# Patient Record
Sex: MALE
[De-identification: names, ages, dates, MRNs, and addresses within clinical notes are randomized per-mention and may not be internally consistent; named-entity substitution may affect disease eponyms.]

---

## 2022-06-27 ENCOUNTER — HOSPITAL ENCOUNTER (EMERGENCY)
Dept: HOSPITAL 5 - ED | Age: 22
LOS: 1 days | Discharge: HOME | End: 2022-06-28
Payer: SELF-PAY

## 2022-06-27 VITALS — DIASTOLIC BLOOD PRESSURE: 75 MMHG | SYSTOLIC BLOOD PRESSURE: 119 MMHG

## 2022-06-27 DIAGNOSIS — R05.9: ICD-10-CM

## 2022-06-27 DIAGNOSIS — Z20.822: ICD-10-CM

## 2022-06-27 DIAGNOSIS — R50.9: Primary | ICD-10-CM

## 2022-06-27 DIAGNOSIS — J34.89: ICD-10-CM

## 2022-06-27 LAB
ALBUMIN SERPL-MCNC: 4.6 G/DL (ref 3.9–5)
ALT SERPL-CCNC: 32 UNITS/L (ref 7–56)
APTT BLD: 32.9 SEC. (ref 24.2–36.6)
BASOPHILS # (AUTO): 0.1 K/MM3 (ref 0–0.1)
BASOPHILS NFR BLD AUTO: 0.5 % (ref 0–1.8)
BUN SERPL-MCNC: 8 MG/DL (ref 9–20)
BUN/CREAT SERPL: 9 %
CALCIUM SERPL-MCNC: 9.4 MG/DL (ref 8.4–10.2)
EOSINOPHIL # BLD AUTO: 0 K/MM3 (ref 0–0.4)
EOSINOPHIL NFR BLD AUTO: 0.1 % (ref 0–4.3)
HCT VFR BLD CALC: 44.6 % (ref 35.5–45.6)
HEMOLYSIS INDEX: 72
HGB BLD-MCNC: 15.2 GM/DL (ref 11.8–15.2)
INR PPP: 1.04 (ref 0.87–1.13)
LYMPHOCYTES # BLD AUTO: 1 K/MM3 (ref 1.2–5.4)
LYMPHOCYTES NFR BLD AUTO: 8.3 % (ref 13.4–35)
MCHC RBC AUTO-ENTMCNC: 34 % (ref 32–34)
MCV RBC AUTO: 88 FL (ref 84–94)
MONOCYTES # (AUTO): 0.9 K/MM3 (ref 0–0.8)
MONOCYTES % (AUTO): 7.4 % (ref 0–7.3)
PLATELET # BLD: 258 K/MM3 (ref 140–440)
RBC # BLD AUTO: 5.06 M/MM3 (ref 3.65–5.03)

## 2022-06-27 PROCEDURE — 84484 ASSAY OF TROPONIN QUANT: CPT

## 2022-06-27 PROCEDURE — 85610 PROTHROMBIN TIME: CPT

## 2022-06-27 PROCEDURE — 99284 EMERGENCY DEPT VISIT MOD MDM: CPT

## 2022-06-27 PROCEDURE — 71275 CT ANGIOGRAPHY CHEST: CPT

## 2022-06-27 PROCEDURE — 80053 COMPREHEN METABOLIC PANEL: CPT

## 2022-06-27 PROCEDURE — 85730 THROMBOPLASTIN TIME PARTIAL: CPT

## 2022-06-27 PROCEDURE — 96375 TX/PRO/DX INJ NEW DRUG ADDON: CPT

## 2022-06-27 PROCEDURE — 85025 COMPLETE CBC W/AUTO DIFF WBC: CPT

## 2022-06-27 PROCEDURE — 96374 THER/PROPH/DIAG INJ IV PUSH: CPT

## 2022-06-27 PROCEDURE — 36415 COLL VENOUS BLD VENIPUNCTURE: CPT

## 2022-06-27 PROCEDURE — 93005 ELECTROCARDIOGRAM TRACING: CPT

## 2022-06-27 PROCEDURE — 96361 HYDRATE IV INFUSION ADD-ON: CPT

## 2022-06-27 PROCEDURE — 83690 ASSAY OF LIPASE: CPT

## 2022-06-27 PROCEDURE — 71046 X-RAY EXAM CHEST 2 VIEWS: CPT

## 2022-06-27 PROCEDURE — 83735 ASSAY OF MAGNESIUM: CPT

## 2022-06-27 NOTE — CAT SCAN REPORT
CTA CHEST WITH CONTRAST



INDICATION / CLINICAL INFORMATION: cp/sob/hypoexia.



TECHNIQUE: Axial CT images were obtained through the chest after injection of Omnipaque 350, 100 cc I
V contrast. 3 plane MIP and/or 3D reconstructions were produced. All CT scans at this location are pe
rformed using CT dose reduction for ALARA by means of automated exposure control. 



COMPARISON: None available.



FINDINGS:

PULMONARY ARTERIES: No pulmonary emboli.

THORACIC AORTA: No significant abnormality. 

HEART: No significant abnormality.

CORONARY ARTERY CALCIFICATION: None.

MEDIASTINUM / MARISELA: No significant abnormality.

PLEURA: No pleural effusion. No pneumothorax.

LUNGS: No acute air space or interstitial disease. 



ADDITIONAL FINDINGS: None.



UPPER ABDOMEN: No acute findings.



SKELETAL STRUCTURES: No significant osseous abnormality.



IMPRESSION:

1. No CT evidence for pulmonary embolism. 

2. Negative for pneumonia.



Signer Name: Lenard Worthington MD 

Signed: 6/27/2022 7:14 PM

Workstation Name: [id number]

## 2022-06-27 NOTE — XRAY REPORT
CHEST PA AND LATERAL VIEWS



INDICATION: 

chest tightness.



COMPARISON: 

None.



FINDINGS:



Support devices: None.

Heart: Within normal limits. 

Lungs/Pleura: No acute pulmonary or pleural findings.  







IMPRESSION:

1. No acute findings.



Signer Name: Venu Burciaga MD 

Signed: 6/27/2022 1:34 PM

Workstation Name: [employer]

## 2022-06-27 NOTE — EMERGENCY DEPARTMENT REPORT
- General


Chief Complaint: Dyspnea/Respdistress


Stated Complaint: VOMITING/CHEST TIGHTNESS


Time Seen by Provider: 22 16:40


Source: patient


Mode of arrival: Ambulatory


Limitations: No Limitations





- History of Present Illness


Initial Comments: 





This is a 21-year-old male nontoxic, well nourished in appearance, no acute 

signs of distress presents to the ED with c/o of productive cough, fever, 

chills, body aches, shortness of breathe, chest tightness, nausea, vomiting 

rhinorrhea, nasal congestion x several days.  Stated is COVID vaccinated.  

Patient describes productive cough as yellow mucus production.  Patient denies 

any sick contacts.  Patient denies any recent travels, long car, recent hospital

stays.  Patient denies any calf pain or calf tenderness.  Denies any other 

complaints or symptoms. Patient denies pleuritic chest pain. Patient denies any 

hemoptysis, numbness, tingling, abdominal pain, headache or stiff neck.  








MD Complaint: fever, cough, rhinorrhea, nasal congestion, other (CP/SOB/n/v)


-: days(s)


Severity: mild


Severity scale (0 -10): 8


Quality: aching


Consistency: constant


Improves With: nothing


Worsens With: nothing


Associated Symptoms: fever, chills, rhinorrhea, nasal congestion, cough, chest 

pain, shortness of breath, nausea, vomiting.  denies: myalgias, diaphoresis, 

headache, sore throat, stiff neck, abdominal pain, diarrhea, dysuria, rash, 

confusion, right sweats, weight loss, epistaxis, hoarseness, ear pain


Treatments Prior to Arrival: none





- Related Data


                                  Previous Rx's











 Medication  Instructions  Recorded  Last Taken  Type


 


Acetaminophen [Acetaminophen 8 650 mg PO Q8H PRN #20 tab 22 Unknown Rx





Hour]    


 


Benzonatate [Tessalon Perles] 100 mg PO Q8HR PRN #12 cap 22 Unknown Rx











                                    Allergies











Allergy/AdvReac Type Severity Reaction Status Date / Time


 


No Known Allergies Allergy   Verified 22 12:58














ED Review of Systems


ROS: 


Stated complaint: VOMITING/CHEST TIGHTNESS


Other details as noted in HPI





Comment: All other systems reviewed and negative


Constitutional: chills, fever


Eyes: denies: eye pain, eye discharge, vision change


ENT: congestion.  denies: ear pain, throat pain, dental pain, hearing loss, 

epistaxis


Respiratory: cough, shortness of breath.  denies: wheezing


Cardiovascular: chest pain.  denies: palpitations, dyspnea on exertion, 

orthopnea, edema, syncope, paroxysmal nocturnal dyspnea


Endocrine: no symptoms reported


Gastrointestinal: nausea, vomiting.  denies: abdominal pain, diarrhea, 

constipation, hematemesis, melena, hematochezia


Genitourinary: denies: urgency, dysuria


Musculoskeletal: denies: back pain, joint swelling, arthralgia


Skin: denies: rash, lesions


Neurological: denies: headache, weakness, paresthesias


Psychiatric: denies: anxiety, depression


Hematological/Lymphatic: denies: easy bleeding, easy bruising





ED Past Medical Hx





- Past Medical History


Previous Medical History?: No





- Surgical History


Past Surgical History?: No





- Social History


Smoking Status: Never Smoker





- Medications


Home Medications: 


                                Home Medications











 Medication  Instructions  Recorded  Confirmed  Last Taken  Type


 


Acetaminophen [Acetaminophen 8 650 mg PO Q8H PRN #20 tab 22  Unknown Rx





Hour]     


 


Benzonatate [Tessalon Perles] 100 mg PO Q8HR PRN #12 cap 22  Unknown Rx














ED Physical Exam





- General


Limitations: No Limitations


General appearance: alert, in no apparent distress





- Head


Head exam: Present: atraumatic, normocephalic





- Eye


Eye exam: Present: normal appearance, PERRL, EOMI





- ENT


ENT exam: Present: normal exam, normal orophraynx, TM's normal bilaterally, 

normal external ear exam





- Neck


Neck exam: Present: normal inspection, full ROM.  Absent: tenderness, 

meningismus, lymphadenopathy





- Respiratory


Respiratory exam: Present: normal lung sounds bilaterally.  Absent: respiratory 

distress, wheezes, rales, rhonchi, stridor, chest wall tenderness, accessory 

muscle use, decreased breath sounds, prolonged expiratory





- Cardiovascular


Cardiovascular Exam: Present: normal rhythm, tachycardia, normal heart sounds.  

Absent: irregular rhythm, systolic murmur, diastolic murmur, rubs, gallop





- GI/Abdominal


GI/Abdominal exam: Present: soft, normal bowel sounds.  Absent: distended, 

tenderness, guarding, rebound, rigid, diminished bowel sounds





- Extremities Exam


Extremities exam: Present: normal inspection, full ROM, normal capillary refill.

 Absent: tenderness





- Back Exam


Back exam: Present: normal inspection, full ROM.  Absent: tenderness, CVA 

tenderness (R), CVA tenderness (L), muscle spasm, paraspinal tenderness, 

vertebral tenderness, rash noted





- Neurological Exam


Neurological exam: Present: alert, oriented X3, normal gait





- Psychiatric


Psychiatric exam: Present: normal affect, normal mood





- Skin


Skin exam: Present: warm, dry, intact, normal color.  Absent: rash





ED Course


                                   Vital Signs











  22





  12:55 17:46 18:22


 


Temperature 101.3 F H  98.6 F


 


Pulse Rate 116 H 96 H 


 


Respiratory 26 H 18 





Rate   


 


Blood Pressure 122/70  


 


Blood Pressure  126/79 





[Left]   


 


O2 Sat by Pulse 98 96 





Oximetry   














  22





  20:26


 


Temperature 97.7 F


 


Pulse Rate 66


 


Respiratory 





Rate 


 


Blood Pressure 


 


Blood Pressure 119/75





[Left] 


 


O2 Sat by Pulse 





Oximetry 














- Reevaluation(s)


Reevaluation #1: 





22 19:50


Patient is speaking in full sentences with no signs of distress noted.





ED Medical Decision Making





- Lab Data


Result diagrams: 


                                 22 13:27





                                 22 13:27








                                   Lab Results











  22 Range/Units





  13:27 13:27 16:52 


 


WBC  11.9 H    (4.5-11.0)  K/mm3


 


RBC  5.06 H    (3.65-5.03)  M/mm3


 


Hgb  15.2    (11.8-15.2)  gm/dl


 


Hct  44.6    (35.5-45.6)  %


 


MCV  88    (84-94)  fl


 


MCH  30    (28-32)  pg


 


MCHC  34    (32-34)  %


 


RDW  12.7 L    (13.2-15.2)  %


 


Plt Count  258    (140-440)  K/mm3


 


Lymph % (Auto)  8.3 L    (13.4-35.0)  %


 


Mono % (Auto)  7.4 H    (0.0-7.3)  %


 


Eos % (Auto)  0.1    (0.0-4.3)  %


 


Baso % (Auto)  0.5    (0.0-1.8)  %


 


Lymph # (Auto)  1.0 L    (1.2-5.4)  K/mm3


 


Mono # (Auto)  0.9 H    (0.0-0.8)  K/mm3


 


Eos # (Auto)  0.0    (0.0-0.4)  K/mm3


 


Baso # (Auto)  0.1    (0.0-0.1)  K/mm3


 


Seg Neutrophils %  83.7 H    (40.0-70.0)  %


 


Seg Neutrophils #  9.9 H    (1.8-7.7)  K/mm3


 


PT    15.1 H  (12.2-14.9)  Sec.


 


INR    1.04  (0.87-1.13)  


 


APTT    32.9  (24.2-36.6)  Sec.


 


Sodium   135 L   (137-145)  mmol/L


 


Potassium   4.1   (3.6-5.0)  mmol/L


 


Chloride   99.7   ()  mmol/L


 


Carbon Dioxide   20 L   (22-30)  mmol/L


 


Anion Gap   19   mmol/L


 


BUN   8 L   (9-20)  mg/dL


 


Creatinine   0.9   (0.8-1.3)  mg/dL


 


Estimated GFR   > 60   ml/min


 


BUN/Creatinine Ratio   9   %


 


Glucose   148 H   ()  mg/dL


 


Calcium   9.4   (8.4-10.2)  mg/dL


 


Magnesium     (1.7-2.3)  mg/dL


 


Total Bilirubin   1.00   (0.1-1.2)  mg/dL


 


AST   22   (5-40)  units/L


 


ALT   32   (7-56)  units/L


 


Alkaline Phosphatase   76   ()  units/L


 


Troponin T     (0.00-0.029)  ng/mL


 


Total Protein   7.8   (6.3-8.2)  g/dL


 


Albumin   4.6   (3.9-5)  g/dL


 


Albumin/Globulin Ratio   1.4   %


 


Lipase   14   (13-60)  units/L














  // Range/Units





  16:52 


 


WBC   (4.5-11.0)  K/mm3


 


RBC   (3.65-5.03)  M/mm3


 


Hgb   (11.8-15.2)  gm/dl


 


Hct   (35.5-45.6)  %


 


MCV   (84-94)  fl


 


MCH   (28-32)  pg


 


MCHC   (32-34)  %


 


RDW   (13.2-15.2)  %


 


Plt Count   (140-440)  K/mm3


 


Lymph % (Auto)   (13.4-35.0)  %


 


Mono % (Auto)   (0.0-7.3)  %


 


Eos % (Auto)   (0.0-4.3)  %


 


Baso % (Auto)   (0.0-1.8)  %


 


Lymph # (Auto)   (1.2-5.4)  K/mm3


 


Mono # (Auto)   (0.0-0.8)  K/mm3


 


Eos # (Auto)   (0.0-0.4)  K/mm3


 


Baso # (Auto)   (0.0-0.1)  K/mm3


 


Seg Neutrophils %   (40.0-70.0)  %


 


Seg Neutrophils #   (1.8-7.7)  K/mm3


 


PT   (12.2-14.9)  Sec.


 


INR   (0.87-1.13)  


 


APTT   (24.2-36.6)  Sec.


 


Sodium   (137-145)  mmol/L


 


Potassium   (3.6-5.0)  mmol/L


 


Chloride   ()  mmol/L


 


Carbon Dioxide   (22-30)  mmol/L


 


Anion Gap   mmol/L


 


BUN   (9-20)  mg/dL


 


Creatinine   (0.8-1.3)  mg/dL


 


Estimated GFR   ml/min


 


BUN/Creatinine Ratio   %


 


Glucose   ()  mg/dL


 


Calcium   (8.4-10.2)  mg/dL


 


Magnesium  2.50 H  (1.7-2.3)  mg/dL


 


Total Bilirubin   (0.1-1.2)  mg/dL


 


AST   (5-40)  units/L


 


ALT   (7-56)  units/L


 


Alkaline Phosphatase   ()  units/L


 


Troponin T  < 0.010  (0.00-0.029)  ng/mL


 


Total Protein   (6.3-8.2)  g/dL


 


Albumin   (3.9-5)  g/dL


 


Albumin/Globulin Ratio   %


 


Lipase   (13-60)  units/L














- EKG Data





22 19:51


Sinus tachycardia at 110 bpm.


No significant ST or T wave abnormalities.


Reviewed and signed by MD.





- Radiology Data





[facility]  


                                     [address] [place] [address]  


 


                                          Cat Scan Report   


                                               Signed  


 


Patient: KEYANA VÁZQUEZ  


R#: [id number]          


: 2000                                                                

Acct:[id number]      


 


Age/Sex: 21 / M                                                                

ADM Date: 22     


 


Loc: ED       


Attending Dr:   


 


 


Ordering Physician: SPIKE OMALLEY NP  


Date of Service: 22  


Procedure(s): CT angio chest  


Accession Number(s): [id number]  


 


cc: SPIKE OMALLEY NP   


 


 


CTA CHEST WITH CONTRAST  


 


 INDICATION / CLINICAL INFORMATION: cp/sob/hypoexia.  


 


 TECHNIQUE: Axial CT images were obtained through the chest after injection of 

Omnipaque 350, 100 cc


IV contrast. 3 plane MIP and/or 3D reconstructions were produced. All CT scans 

at this location are 


performed using CT dose reduction for ALARA by means of automated exposure 

control.   


 


 COMPARISON: None available.  


 


 FINDINGS:  


 PULMONARY ARTERIES: No pulmonary emboli.  


 THORACIC AORTA: No significant abnormality.   


 HEART: No significant abnormality.  


 CORONARY ARTERY CALCIFICATION: None.  


 MEDIASTINUM / MARISELA: No significant abnormality.  


 PLEURA: No pleural effusion. No pneumothorax.  


 LUNGS: No acute air space or interstitial disease.   


 


 ADDITIONAL FINDINGS: None.  


 


 UPPER ABDOMEN: No acute findings.  


 


 SKELETAL STRUCTURES: No significant osseous abnormality.  


 


 IMPRESSION:  


 1. No CT evidence for pulmonary embolism.   


 2. Negative for pneumonia.  


 


 Signer Name: Lenard Worthington MD   


 Signed: 2022 7:14 PM  


 Workstation Name: [id number]   


 


 


Transcribed By: JOHN  


Dictated By: Lenard Worthington MD  


Electronically Authenticated By: Lenard Worthington MD    


Signed Date/Time: 22                                


 


 


 


DD/DT: 22                                                            

  


TD/TT:








[facility]  


                                     [address] [place] [address]  


 


                                            XRay Report   


                                               Signed  


 


Patient: KEYANA VÁZQUEZ  


R#: [id number]          


: 2000                                                                

Acct:[id number]      


 


Age/Sex: 21 / M                                                                

ADM Date: 22     


 


Loc: ED       


Attending Dr:   


 


 


Ordering Physician: LISA SANTILLAN MD  


Date of Service: 22  


Procedure(s): XR chest routine 2V  


Accession Number(s): [id number]  


 


cc: LISA SANTILLAN MD   


 


Fluoro Time In Minutes:   


 


CHEST PA AND LATERAL VIEWS  


 


 INDICATION:   


 chest tightness.  


 


 COMPARISON:   


 None.  


 


 FINDINGS:  


 


 Support devices: None.  


 Heart: Within normal limits.   


 Lungs/Pleura: No acute pulmonary or pleural findings.    


 


 


 


 IMPRESSION:  


 1. No acute findings.  


 


 Signer Name: Venu Burciaga MD   


 Signed: 2022 1:34 PM  


 Workstation Name: [id number]   


 


 


Transcribed By: DARLYN  


Dictated By: eVnu Burciaga MD  


Electronically Authenticated By: Venu Burciaga MD    


Signed Date/Time: 22                                


 


 


 


DD/DT: 22                                                            

  


TD/TT:





- Medical Decision Making





This is a 21-year-old male that presents with suspected covid.  Patient is 

stable and was examined by me.  DONTE and HEART score 0 pints. PERC score for 

DVT/SVT/PE 0 points.  EKG normal sinus rhythm with no significant changes in ST.

  Negative troponin.  Chest x-ray has been obtained and dictated by radiologist 

with normal exam.  Patient is notified of x-ray results with no questions noted.

 Patient does meet clinical concerns of COVID-19 and patient was instructed and 

educated on signs and symptoms and to self quarantine and seek medical attention

 as soon as possible if symptoms does occur. Patient was instructed to increase 

hydration, rest and take Tylenol for fever episodes.  Patient received medical 

treatment in the ED.  Vitals stable. Patient is nonfebrile and normal heart 

rate. Patient was instructed Follow-up with a primary care doctor in 3-5 days or

 if symptoms worsen and continue return to emergency room as soon as possible.  

At time time of discharge, the patient does not seem toxic or ill in appearance.

  No acute signs of distress noted.  Patient agrees to discharge treatment plan 

of care.  No further questions noted by the patient.nt.








Critical care attestation.: 


If time is entered above; I have spent that time in minutes in the direct care 

of this critically ill patient, excluding procedure time.








ED Disposition


Clinical Impression: 


 Suspected COVID-19 virus infection





Disposition:  HOME / SELF CARE / HOMELESS


Is pt being admited?: No


Does the pt Need Aspirin: No


Condition: Stable


Instructions:  COVID-19 Frequently Asked Questions, COVID-19


Additional Instructions: 


Follow-up with a primary care doctor in 3-5 days or if symptoms worsen and co

ntinue return to emergency room as soon as possible. 





Given this current pandemic, COVID-19 is in the differential of possibilities.  

Despite your previous negative COVID-19 test, I do recommend repeat outpatient 

Covid 19 testing.  In the meantime, isolate/quarantine yourself and stay away 

from anyone who is elderly, immunocompromised or chronically ill. 





Please see your nearest health department or primary care doctor that you are 

referred to for COVID testing.





Increased rest, hydration, and take over-the-counter Tylenol as directed from 

instructions label for pain/fever episode.


Prescriptions: 


Acetaminophen [Acetaminophen 8 Hour] 650 mg PO Q8H PRN #20 tab


 PRN Reason: pain/fever


Benzonatate [Tessalon Perles] 100 mg PO Q8HR PRN #12 cap


 PRN Reason: Cough


Referrals: 


PRIMARY CARE,MD [Referring] - 3-5 Days


ACACIA ABBASI MD [Staff Physician] - 3-5 Days


Time of Disposition: 20:21

## 2022-06-28 NOTE — ELECTROCARDIOGRAPH REPORT
[facility]

                                       

Test Date:    2022               Test Time:    13:00:44

Pat Name:     KEYANA WONGODepartment:   

Patient ID:   [id number]          Room:          

Gender:       M                        Technician:   RICHARD

:          2000               Requested By: ED DOC

Order Number: [id number]              Reading MD:   Moses Zamora

                                 Measurements

Intervals                              Axis          

Rate:         110                      P:            54

FL:           148                      QRS:          69

QRSD:         95                       T:            38

QT:           302                                    

QTc:          409                                    

                           Interpretive Statements

Sinus tachycardia

Probable left atrial enlargement

ST elev, probable normal early repol pattern

No previous ECG available for comparison

Electronically Signed On 2022 12:11:12 EDT by Moses Zamora